# Patient Record
Sex: MALE
[De-identification: names, ages, dates, MRNs, and addresses within clinical notes are randomized per-mention and may not be internally consistent; named-entity substitution may affect disease eponyms.]

---

## 2019-06-26 ENCOUNTER — HOSPITAL ENCOUNTER (EMERGENCY)
Dept: HOSPITAL 88 - ER | Age: 39
Discharge: HOME | End: 2019-06-26
Payer: MEDICARE

## 2019-06-26 VITALS — HEIGHT: 67 IN | BODY MASS INDEX: 27.47 KG/M2 | WEIGHT: 175 LBS

## 2019-06-26 VITALS — SYSTOLIC BLOOD PRESSURE: 125 MMHG | DIASTOLIC BLOOD PRESSURE: 75 MMHG

## 2019-06-26 DIAGNOSIS — Z93.3: ICD-10-CM

## 2019-06-26 DIAGNOSIS — I10: ICD-10-CM

## 2019-06-26 DIAGNOSIS — F17.200: ICD-10-CM

## 2019-06-26 DIAGNOSIS — R55: Primary | ICD-10-CM

## 2019-06-26 DIAGNOSIS — E86.0: ICD-10-CM

## 2019-06-26 LAB
ALBUMIN SERPL-MCNC: 4.3 G/DL (ref 3.5–5)
ALBUMIN/GLOB SERPL: 1.3 {RATIO} (ref 0.8–2)
ALP SERPL-CCNC: 55 IU/L (ref 40–150)
ALT SERPL-CCNC: 33 IU/L (ref 0–55)
ANION GAP SERPL CALC-SCNC: 16.3 MMOL/L (ref 8–16)
BACTERIA URNS QL MICRO: (no result) /HPF
BASOPHILS # BLD AUTO: 0.1 10*3/UL (ref 0–0.1)
BASOPHILS NFR BLD AUTO: 0.7 % (ref 0–1)
BILIRUB UR QL: NEGATIVE
BUN SERPL-MCNC: 11 MG/DL (ref 7–26)
BUN/CREAT SERPL: 13 (ref 6–25)
CALCIUM SERPL-MCNC: 9.6 MG/DL (ref 8.4–10.2)
CHLORIDE SERPL-SCNC: 102 MMOL/L (ref 98–107)
CK MB SERPL-MCNC: 0.7 NG/ML (ref 0–5)
CK SERPL-CCNC: 49 IU/L (ref 30–200)
CLARITY UR: CLEAR
CO2 SERPL-SCNC: 21 MMOL/L (ref 22–29)
COLOR UR: YELLOW
DEPRECATED APTT PLAS QN: 28.1 SECONDS (ref 23.8–35.5)
DEPRECATED INR PLAS: 0.92
DEPRECATED NEUTROPHILS # BLD AUTO: 6 10*3/UL (ref 2.1–6.9)
DEPRECATED RBC URNS MANUAL-ACNC: (no result) /HPF (ref 0–5)
EGFRCR SERPLBLD CKD-EPI 2021: > 60 ML/MIN (ref 60–?)
EOSINOPHIL # BLD AUTO: 0.1 10*3/UL (ref 0–0.4)
EOSINOPHIL NFR BLD AUTO: 1.5 % (ref 0–6)
EPI CELLS URNS QL MICRO: (no result) /LPF
ERYTHROCYTE [DISTWIDTH] IN CORD BLOOD: 14.2 % (ref 11.7–14.4)
GLOBULIN PLAS-MCNC: 3.3 G/DL (ref 2.3–3.5)
GLUCOSE SERPLBLD-MCNC: 134 MG/DL (ref 74–118)
HCT VFR BLD AUTO: 43.9 % (ref 38.2–49.6)
HGB BLD-MCNC: 15.4 G/DL (ref 14–18)
KETONES UR QL STRIP.AUTO: NEGATIVE
LEUKOCYTE ESTERASE UR QL STRIP.AUTO: NEGATIVE
LYMPHOCYTES # BLD: 2.1 10*3/UL (ref 1–3.2)
LYMPHOCYTES NFR BLD AUTO: 24.1 % (ref 18–39.1)
MCH RBC QN AUTO: 29.2 PG (ref 28–32)
MCHC RBC AUTO-ENTMCNC: 35.1 G/DL (ref 31–35)
MCV RBC AUTO: 83.1 FL (ref 81–99)
MONOCYTES # BLD AUTO: 0.5 10*3/UL (ref 0.2–0.8)
MONOCYTES NFR BLD AUTO: 5.7 % (ref 4.4–11.3)
NEUTS SEG NFR BLD AUTO: 67.7 % (ref 38.7–80)
NITRITE UR QL STRIP.AUTO: NEGATIVE
PLATELET # BLD AUTO: 230 X10E3/UL (ref 140–360)
POTASSIUM SERPL-SCNC: 3.3 MMOL/L (ref 3.5–5.1)
PROT UR QL STRIP.AUTO: (no result)
PROTHROMBIN TIME: 12.8 SECONDS (ref 11.9–14.5)
RBC # BLD AUTO: 5.28 X10E6/UL (ref 4.3–5.7)
SODIUM SERPL-SCNC: 136 MMOL/L (ref 136–145)
SP GR UR STRIP: 1.02 (ref 1.01–1.02)
UROBILINOGEN UR STRIP-MCNC: 0.2 MG/DL (ref 0.2–1)
WAXY CASTS URNS QL MICRO: (no result)
WBC #/AREA URNS HPF: (no result) /HPF (ref 0–5)

## 2019-06-26 PROCEDURE — 80053 COMPREHEN METABOLIC PANEL: CPT

## 2019-06-26 PROCEDURE — 81001 URINALYSIS AUTO W/SCOPE: CPT

## 2019-06-26 PROCEDURE — 93005 ELECTROCARDIOGRAM TRACING: CPT

## 2019-06-26 PROCEDURE — 83880 ASSAY OF NATRIURETIC PEPTIDE: CPT

## 2019-06-26 PROCEDURE — 99284 EMERGENCY DEPT VISIT MOD MDM: CPT

## 2019-06-26 PROCEDURE — 85730 THROMBOPLASTIN TIME PARTIAL: CPT

## 2019-06-26 PROCEDURE — 85025 COMPLETE CBC W/AUTO DIFF WBC: CPT

## 2019-06-26 PROCEDURE — 82550 ASSAY OF CK (CPK): CPT

## 2019-06-26 PROCEDURE — 84484 ASSAY OF TROPONIN QUANT: CPT

## 2019-06-26 PROCEDURE — 36415 COLL VENOUS BLD VENIPUNCTURE: CPT

## 2019-06-26 PROCEDURE — 82553 CREATINE MB FRACTION: CPT

## 2019-06-26 PROCEDURE — 85610 PROTHROMBIN TIME: CPT

## 2019-06-26 NOTE — XMS REPORT
Patient Summary Document

                             Created on: 2019



ANNA BINGHAM

External Reference #: [id number]

: 1980

Sex: Male



Demographics







                          Address                   [address]

[place]  [address]

 

                          Home Phone                [phone]

 

                          Preferred Language        Unknown

 

                          Marital Status            Unknown

 

                          Judaism Affiliation     Unknown

 

                          Race                      Unknown

 

                                        Additional Race(s)  

 

                          Ethnic Group              Unknown





Author







                          Author                    [facility]

 

                          Address                   Unknown

 

                          Phone                     Unavailable







Support







                Name            Relationship    Address         Phone

 

                    BRAD HELM    PRS                 [address]

[place]  [address]                     [phone]

 

                    BRAD HELM    PRS                 [address]

[place]  [address]                     [phone]







Care Team Providers







                    Care Team Member Name    Role                Phone

 

                          Unavailable               Unavailable







Payers







             Payer Name    Policy Type    Policy Number    Effective Date    Expiration Date







Problems

This patient has no known problems.



Allergies, Adverse Reactions, Alerts







          Allergy Name    Allergy Type    Status    Severity    Reaction(s)    Onset Date    Inactive 

Date                      Treating Clinician        Comments

 

        No Known Intolerances    DA      Active    U               2011 00:00:00                     







Medications

This patient has no known medications.



Results







           Test Description    Test Time    Test Comments    Text Results    Atomic Results    Result

 Comments

 

                - CT ABD PELVIS W/CONT    2019-05-10 14:49:00                      Name: ANNA HELM           

          [facility]                     : 1980 Age/S: 38  / M     
   [address]                Unit #: [id number]     Loc:               
[place]  [address]              Phys: Aggie Summers MD                   
                           Acct: [id number]  Dis Date:               Status: 
REG ER                                  PHONE #: [phone]     Exam Date: 
05/10/2019  1429                     FAX #: [phone]      Reason: RLQ PAIN, 
COLOSTOMY, RECENT SX                      EXAMS:                                
              CPT CODE:      [id number] CT ABD PELVIS W/CONT                     
 [address]                            REASON FOR EXAM: RLQ PAIN, COLOSTOMY, RECENT 
SX               EXAM ORDER DATE: 5/10/2019 2:03 PM               Ordering M.TIM:
Aggie Summers MD               PROCEDURE:  - CT ABD PELVIS W/CONT          
    COMPARISON:               FINDINGS: CT images of the abdomen and pelvis were
obtained with IV       and without oral contrast at 5mm. Dose modulation, 
iterative       reconstruction, and/or weight based adjustment of the MA/KV was 
     utilized to reduce the radiation dose to as low as reasonably       
achievable.                Intravenous contrast: 100cc of Omnipaque 370.        
      The liver, spleen, pancreas are grossly within normal limits.        The 
patient is status post cholecystectomy               The right kidney was not 
seen suggestive of status post right       nephrectomy.  The urinary bladder is 
unremarkable.               The colon, small bowel, and stomach are within norm
al limits without       evidence of obstruction.  The appendix is not seen      
        No evidence of free air or free fluid.                 IMPRESSION: Right
pelvic fluid collection suggestive of an abscess         measuring 8 x 2 cm.  
The collection extends from the umbilical region         to the right low pelvis
adjacent to the urinary bladder with minimal         mass effect on the urinary 
bladder.          ** Electronically Signed by KLAUDIA Chambers on 05/10/2019 at 1442
**                      Reported and signed by: Oliver Chambers M.D.     CC: 
Aggie Summers MD; Denys Warren                                      
                                                       Technologist:RUIZ BONILLA RT(R)           CTDI:        DLP:        Trnscb Date/Time: 05/10/2019 
(3912) t.SDR.VTL                        Orig Print D/T: S: 05/10/2019 (0196)    
 PAGE  1                       Signed Report                                    

 

                PROCALCITONIN (PCT)    2019-05-10 14:47:00                      

 

   

 

                PROCALCITONIN (PCT) (test code=PROCAL)    < 0.05 ng/ml                    Concentration   Interpretation

   (ng/mL)      -------------   
--------------------------------------------<0.51           Sepsis is not 
likely.                Local bacterial infection is possible.                
(LOW RISK for progression to Sepsis) 0.51 - 2.00     Sepsis is possible, but 
other conditions                are known to elevate PCT as well.               
(MODERATE RISK for progression to Sepsis) > 2.00          Sepsis is likely, 
unless other causes are                known.                (HIGH RISK for 
progression to Severe Sepsis                or Septic Shock) 10.00           
High likelihood of Severe Sepsis or Septic  or higher     Shock.                
*Increased PCT levels may not always be related to systemic bacterial 
infection.*Low PCT levels do not automatically exclude the presence of bacterial
infection.*All results should be interpreted taking into account the  patients 
history.





URINALYSIS COMPLETE2019-05-10 14:33:00* 





                Test Item       Value           Reference Range    Comments

 

                UA COLOR (test code=COLU)    Light-Yellow    YELLOW           

 

                UA APPEARANCE (test code=APPU)    CLEAR           CLEAR            

 

                UA GLUCOSE DIPSTICK (test code=DGLUU)    NEGATIVE mg/dL    NEGATIVE         

 

                UA BILIRUBIN DIPSTICK (test code=BILU)    NEGATIVE mg/dL    NEGATIVE         

 

                UA KETONE DIPSTICK (test code=KETU)    NEGATIVE mg/dL    NEGATIVE         

 

                UA SPECIFIC GRAVITY (test code=SGU)    1.015           1.001-1.035      

 

                UA BLOOD DIPSTICK (test code=SHIRA)    Negative mg/dL    NEGATIVE         

 

                UA PH DIPSTICK (test code=SHA)    7.5             5.0-8.0          

 

                UA PROTEIN DIPSTICK (test code=PROU)    NEGATIVE mg/dL    NEGATIVE         

 

                UA UROBILINIOGEN DIPSTICK (test code=URO)    Normal mg/dL    NEGATIVE         

 

                UA NITRITE DIPSTICK (test code=WILLA)    NEGATIVE        NEGATIVE         

 

                UA LEUKOCYTE ESTERASE W REFLEX (test code=LEUUR)    NEGATIVE Sanjuana/uL    NEGATIVE         

 

                UA WBC (test code=WBCU)    0-5 per HPF     0-5              

 

                UA RBC (test code=RBCU)    0-2 #/HPF       0-5              

 

                UA EPITHELIAL CELLS (test code=EPIU)    FEW per HPF     FEW              

 

                UA BACTERIA (test code=BACU)    FEW #/HPF       NONE             





Urine Source? Clean CatchLACTIC ACID2019-05-10 14:21:00* 





                Test Item       Value           Reference Range    Comments

 

                LACTIC ACID (test code=LACT)    1.2 mmol/L      0.4-1.9          





BASIC METABOLIC PANEL2019-05-10 14:20:00* 





                Test Item       Value           Reference Range    Comments

 

                SODIUM (test code=NA)    137 mmol/L      136-145          

 

                POTASSIUM (test code=K)    3.9 mmol/L      3.5-5.1          

 

                CHLORIDE (test code=CL)    103.0 mmol/L               

 

                CARBON DIOXIDE (test code=CO2)    28.0 mmol/L     21-32            

 

                ANION GAP (test code=GAP)    9.9             10-20            

 

                GLUCOSE (test code=GLU)    108 mg/dL                  

 

                BLOOD UREA NITROGEN (test code=BUN)    8 mg/dL         7-18             

 

                GLOMERULAR FILTRATION RATE (test code=GFR)    > 60 mL/min     >=60            Estimated GFR by 

using Modified MDRD formula.Chronic kidney disease is defined as either kidney 
damageor GFR <60 mL/min/1.73 m2 for >3 months.

 

                CREATININE (test code=CREAT)    0.80 mg/dL      0.7-1.3          

 

                BUN/CREATININE RATIO (test code=BUN/CREA)    10.0            10-20            

 

                CALCIUM (test code=CA)    9.2 mg/dL       8.5-10.1         





HEPATIC FUNCTION PANEL2019-05-10 14:20:00* 





                Test Item       Value           Reference Range    Comments

 

                TOTAL PROTEIN (test code=PROT)    7.9 gram/dL     6.4-8.2          

 

                ALBUMIN (test code=ALB)    3.5 g/dL        3.4-5.0          

 

                GLOBULIN (test code=GLOB)    4.4 gram/dL     2.7-4.2          

 

                ALBUMIN/GLOBULIN RATIO (test code=A/G)    0.8             0.75-1.50        

 

                BILIRUBIN TOTAL (test code=BILT)    0.40 mg/dL      0.0-1.0          

 

                BILIRUBIN DIRECT (test code=BILD)    0.13 mg/dL      0.0-0.20         

 

                SGOT/AST (test code=AST)    15 IUnit/L      15-37            

 

                SGPT/ALT (test code=ALT)    20 IUnit/L      12-78            

 

                ALKALINE PHOSPHATASE TOTAL (test code=ALKP)    99 IUnit/L                **Note change in

 reference range due to change in reagent.**





CBC W/AUTO DIFF2019-05-10 14:04:00* 





                Test Item       Value           Reference Range    Comments

 

                WHITE BLOOD CELL (test code=WBC)    11.9 K/mm3      4.5-12.5         

 

                RED BLOOD CELL (test code=RBC)    3.80 mill/mm3    4.0-5.8          

 

                HEMOGLOBIN (test code=HGB)    10.5 gram/dL    13.0-17.5        

 

                HEMATOCRIT (test code=HCT)    33.3 %          42.0-52.0        

 

                MEAN CELL VOLUME (test code=MCV)    87.6 fL         80-98            

 

                MEAN CELL HGB (test code=MCH)    27.6 picogram    27.0-33.0        

 

                MEAN CELL HGB CONCETRATION (test code=MCHC)    31.5 gram/dL    33.0-36.0        

 

                RED CELL DISTRIBUTION WIDTH (test code=RDW)    15.1 %          11.6-16.2        

 

                RED CELL DISTRIBUTION WIDTH SD (test code=RDW-SD)    48.5 fL         37.0-51.0        

 

                PLATELET COUNT (test code=PLT)    303 K/mm3       150-450          

 

                MEAN PLATELET VOLUME (test code=MPV)    8.5 fL          6.7-11.0         

 

                NEUTROPHIL % (test code=NT%)    75.8 %          39.0-69.0        

 

                IMMATURE GRANULOCYTE % (test code=IG%)    0.4 %           0.0-5.0          

 

                LYMPHOCYTE % (test code=LY%)    17.0 %          25.0-55.0        

 

                MONOCYTE % (test code=MO%)    5.6 %           0.0-10.0         

 

                EOSINOPHIL % (test code=EO%)    0.9 %           0.0-5.0          

 

                BASOPHIL % (test code=BA%)    0.3 %           0.0-1.0          

 

                NUCLEATED RBC % (test code=NRBC%)    0.0 %           0-0              

 

                NEUTROPHIL # (test code=NT#)    8.99 K/mm3      1.8-7.7          

 

                IMMATURE GRANULOCYTE # (test code=IG#)    0.05 x10 3/uL    0-0.03           

 

                LYMPHOCYTE # (test code=LY#)    2.02 K/mm3      1.0-5.0          

 

                MONOCYTE # (test code=MO#)    0.67 K/mm3      0-0.8            

 

                EOSINOPHIL # (test code=EO#)    0.11 K/mm3      0.0-0.5          

 

                BASOPHIL # (test code=BA#)    0.03 K/mm3      0.0-0.2          

 

                NUCLEATED RBC # (test code=NRBC#)    0.00 K/mm3      0.0-0.1          

 

                MANUAL DIFF REQUIRED (test code=MDIFF)    NO                               





URINALYSIS COMPLETE2019-05-10 14:00:00* 





                Test Item       Value           Reference Range    Comments

 

                UA COLOR (test code=COLU)    Light-Yellow    YELLOW           

 

                UA APPEARANCE (test code=APPU)    CLEAR           CLEAR            

 

                UA GLUCOSE DIPSTICK (test code=DGLUU)    NEGATIVE mg/dL    NEGATIVE         

 

                UA BILIRUBIN DIPSTICK (test code=BILU)    NEGATIVE mg/dL    NEGATIVE         

 

                UA KETONE DIPSTICK (test code=KETU)    NEGATIVE mg/dL    NEGATIVE         

 

                UA SPECIFIC GRAVITY (test code=SGU)    1.015           1.001-1.035      

 

                UA BLOOD DIPSTICK (test code=SHIRA)    Negative mg/dL    NEGATIVE         

 

                UA PH DIPSTICK (test code=SHA)    7.5             5.0-8.0          

 

                UA PROTEIN DIPSTICK (test code=PROU)    NEGATIVE mg/dL    NEGATIVE         

 

                UA UROBILINIOGEN DIPSTICK (test code=URO)    Normal mg/dL    NEGATIVE         

 

                UA NITRITE DIPSTICK (test code=WILLA)    NEGATIVE        NEGATIVE         

 

                UA LEUKOCYTE ESTERASE W REFLEX (test code=LEUUR)    NEGATIVE Sanjuana/uL    NEGATIVE         

 

                UA WBC (test code=WBCU)     per HPF        0-5              

 

                UA RBC (test code=RBCU)     per HPF        0-5              

 

                UA EPITHELIAL CELLS (test code=EPIU)     per HPF        Few              

 

                UA BACTERIA (test code=BACU)     per HPF        NONE             





Urine Source? Clean Catch

## 2020-07-03 ENCOUNTER — HOSPITAL ENCOUNTER (OUTPATIENT)
Dept: HOSPITAL 88 - RAD | Age: 40
End: 2020-07-03
Attending: UROLOGY
Payer: COMMERCIAL

## 2020-07-03 DIAGNOSIS — Z53.8: ICD-10-CM

## 2020-07-03 DIAGNOSIS — Z01.818: Primary | ICD-10-CM

## 2020-07-03 DIAGNOSIS — Z11.59: ICD-10-CM

## 2020-07-03 DIAGNOSIS — N43.3: ICD-10-CM

## 2020-07-03 LAB
ANION GAP SERPL CALC-SCNC: 9.9 MMOL/L (ref 8–16)
BASOPHILS # BLD AUTO: 0.1 10*3/UL (ref 0–0.1)
BASOPHILS NFR BLD AUTO: 0.9 % (ref 0–1)
BUN SERPL-MCNC: 9 MG/DL (ref 7–26)
BUN/CREAT SERPL: 9 (ref 6–25)
CALCIUM SERPL-MCNC: 9.2 MG/DL (ref 8.4–10.2)
CHLORIDE SERPL-SCNC: 105 MMOL/L (ref 98–107)
CO2 SERPL-SCNC: 28 MMOL/L (ref 22–29)
DEPRECATED NEUTROPHILS # BLD AUTO: 3.2 10*3/UL (ref 2.1–6.9)
EGFRCR SERPLBLD CKD-EPI 2021: > 60 ML/MIN (ref 60–?)
EOSINOPHIL # BLD AUTO: 0.2 10*3/UL (ref 0–0.4)
EOSINOPHIL NFR BLD AUTO: 2.7 % (ref 0–6)
ERYTHROCYTE [DISTWIDTH] IN CORD BLOOD: 13.1 % (ref 11.7–14.4)
GLUCOSE SERPLBLD-MCNC: 88 MG/DL (ref 74–118)
HCT VFR BLD AUTO: 48.2 % (ref 38.2–49.6)
HGB BLD-MCNC: 16.6 G/DL (ref 14–18)
LYMPHOCYTES # BLD: 2.5 10*3/UL (ref 1–3.2)
LYMPHOCYTES NFR BLD AUTO: 39.3 % (ref 18–39.1)
MCH RBC QN AUTO: 29.6 PG (ref 28–32)
MCHC RBC AUTO-ENTMCNC: 34.4 G/DL (ref 31–35)
MCV RBC AUTO: 85.9 FL (ref 81–99)
MONOCYTES # BLD AUTO: 0.4 10*3/UL (ref 0.2–0.8)
MONOCYTES NFR BLD AUTO: 6.9 % (ref 4.4–11.3)
NEUTS SEG NFR BLD AUTO: 50 % (ref 38.7–80)
PLATELET # BLD AUTO: 226 X10E3/UL (ref 140–360)
POTASSIUM SERPL-SCNC: 3.9 MMOL/L (ref 3.5–5.1)
RBC # BLD AUTO: 5.61 X10E6/UL (ref 4.3–5.7)
SODIUM SERPL-SCNC: 139 MMOL/L (ref 136–145)

## 2020-07-03 PROCEDURE — 93005 ELECTROCARDIOGRAM TRACING: CPT

## 2020-07-03 PROCEDURE — 85025 COMPLETE CBC W/AUTO DIFF WBC: CPT

## 2020-07-03 PROCEDURE — 36415 COLL VENOUS BLD VENIPUNCTURE: CPT

## 2020-07-03 PROCEDURE — 80048 BASIC METABOLIC PNL TOTAL CA: CPT

## 2020-09-14 LAB
ANION GAP SERPL CALC-SCNC: 12.9 MMOL/L (ref 8–16)
BASOPHILS # BLD AUTO: 0.1 10*3/UL (ref 0–0.1)
BASOPHILS NFR BLD AUTO: 0.9 % (ref 0–1)
BUN SERPL-MCNC: 7 MG/DL (ref 7–26)
BUN/CREAT SERPL: 8 (ref 6–25)
CALCIUM SERPL-MCNC: 9.1 MG/DL (ref 8.4–10.2)
CHLORIDE SERPL-SCNC: 109 MMOL/L (ref 98–107)
CO2 SERPL-SCNC: 22 MMOL/L (ref 22–29)
DEPRECATED NEUTROPHILS # BLD AUTO: 3.5 10*3/UL (ref 2.1–6.9)
EGFRCR SERPLBLD CKD-EPI 2021: > 60 ML/MIN (ref 60–?)
EOSINOPHIL # BLD AUTO: 0.3 10*3/UL (ref 0–0.4)
EOSINOPHIL NFR BLD AUTO: 4.5 % (ref 0–6)
ERYTHROCYTE [DISTWIDTH] IN CORD BLOOD: 13.4 % (ref 11.7–14.4)
GLUCOSE SERPLBLD-MCNC: 120 MG/DL (ref 74–118)
HCT VFR BLD AUTO: 47.9 % (ref 38.2–49.6)
HGB BLD-MCNC: 16.6 G/DL (ref 14–18)
LYMPHOCYTES # BLD: 2.6 10*3/UL (ref 1–3.2)
LYMPHOCYTES NFR BLD AUTO: 38.5 % (ref 18–39.1)
MCH RBC QN AUTO: 29.9 PG (ref 28–32)
MCHC RBC AUTO-ENTMCNC: 34.7 G/DL (ref 31–35)
MCV RBC AUTO: 86.2 FL (ref 81–99)
MONOCYTES # BLD AUTO: 0.3 10*3/UL (ref 0.2–0.8)
MONOCYTES NFR BLD AUTO: 4.8 % (ref 4.4–11.3)
NEUTS SEG NFR BLD AUTO: 51 % (ref 38.7–80)
PLATELET # BLD AUTO: 261 X10E3/UL (ref 140–360)
POTASSIUM SERPL-SCNC: 3.9 MMOL/L (ref 3.5–5.1)
RBC # BLD AUTO: 5.56 X10E6/UL (ref 4.3–5.7)
SODIUM SERPL-SCNC: 140 MMOL/L (ref 136–145)

## 2020-09-17 ENCOUNTER — HOSPITAL ENCOUNTER (OUTPATIENT)
Dept: HOSPITAL 88 - OR | Age: 40
Discharge: HOME | End: 2020-09-17
Attending: UROLOGY
Payer: COMMERCIAL

## 2020-09-17 VITALS — DIASTOLIC BLOOD PRESSURE: 86 MMHG | SYSTOLIC BLOOD PRESSURE: 151 MMHG

## 2020-09-17 DIAGNOSIS — Z88.8: ICD-10-CM

## 2020-09-17 DIAGNOSIS — Z01.810: ICD-10-CM

## 2020-09-17 DIAGNOSIS — I10: ICD-10-CM

## 2020-09-17 DIAGNOSIS — N43.3: Primary | ICD-10-CM

## 2020-09-17 DIAGNOSIS — Z01.812: ICD-10-CM

## 2020-09-17 DIAGNOSIS — E78.5: ICD-10-CM

## 2020-09-17 DIAGNOSIS — Z11.59: ICD-10-CM

## 2020-09-17 PROCEDURE — 85025 COMPLETE CBC W/AUTO DIFF WBC: CPT

## 2020-09-17 PROCEDURE — 36415 COLL VENOUS BLD VENIPUNCTURE: CPT

## 2020-09-17 PROCEDURE — 93005 ELECTROCARDIOGRAM TRACING: CPT

## 2020-09-17 PROCEDURE — 55040 REMOVAL OF HYDROCELE: CPT

## 2020-09-17 PROCEDURE — 80048 BASIC METABOLIC PNL TOTAL CA: CPT

## 2020-09-27 NOTE — OPERATIVE REPORT
DATE OF PROCEDURE:  09/17/2020

 

SURGEON:  Srinivasan Pettit MD

 

PREOPERATIVE DIAGNOSIS:  Right hydrocele.

 

POSTOPERATIVE DIAGNOSIS:  Right hydrocele.

 

OPERATIVE PROCEDURE PERFORMED:  Right hydrocelectomy.

 

ANESTHESIA:  General anesthesia.

 

ESTIMATED BLOOD LOSS:  Minimal.

 

INDICATIONS:  Mr. Kyaw Schulz is a 39-year-old man with a history of an enlarging

right hydrocele.  He now presents for definitive surgical management of this problem. 

 

PROCEDURE IN DETAIL:  The patient was brought to the operating room and placed in supine

position.  After initiation of general anesthesia, was placed in dorsal lithotomy

position and prepped and draped in usual sterile fashion.  A horizontal incision was

made over the right hemiscrotum and dissection was carried out to the layers of the

scrotum.  The tunica vaginalis was entered and a moderate amount of straw-colored fluid

was obtained.  The cut edges of the tunica vaginalis were fulgurated with electrocautery

device and oversewn using a running chromic suture.  The testicle was then returned to

its normal anatomical position in the scrotum.  Prior to replacement, epididymis and

appendix testis was removed.  A quarter-inch Penrose drain was placed and allowed to

drain from the inferior most portion of the right hemiscrotum, this was secured to the

skin using a chromic suture.  The scrotal wound was then closed in layers.  The skin

being closed with a baseball stitch.  The wound was then cleaned and dried, and covered

with Telfa and placed in a scrotal support.  Anesthesia was reversed and the patient was

transferred to a bed and taken to the postanesthesia care unit in good condition.  Of

note, the needle and instrument counts were correct at the conclusion of the case. 

 

 

 

 

______________________________

Srinivasan Pettit MD

 

HLW/MODL

D:  09/26/2020 21:19:57

T:  09/26/2020 21:36:38

Job #:  [id number]